# Patient Record
Sex: MALE | Race: WHITE | NOT HISPANIC OR LATINO | URBAN - METROPOLITAN AREA
[De-identification: names, ages, dates, MRNs, and addresses within clinical notes are randomized per-mention and may not be internally consistent; named-entity substitution may affect disease eponyms.]

---

## 2024-04-27 ENCOUNTER — EMERGENCY (EMERGENCY)
Facility: HOSPITAL | Age: 19
LOS: 1 days | Discharge: HOME | End: 2024-04-27
Attending: STUDENT IN AN ORGANIZED HEALTH CARE EDUCATION/TRAINING PROGRAM
Payer: SELF-PAY

## 2024-04-27 VITALS
RESPIRATION RATE: 18 BRPM | TEMPERATURE: 98 F | DIASTOLIC BLOOD PRESSURE: 67 MMHG | OXYGEN SATURATION: 99 % | SYSTOLIC BLOOD PRESSURE: 111 MMHG | HEART RATE: 85 BPM

## 2024-04-27 PROCEDURE — 12001 RPR S/N/AX/GEN/TRNK 2.5CM/<: CPT | Mod: F9

## 2024-04-27 PROCEDURE — 99283 EMERGENCY DEPT VISIT LOW MDM: CPT

## 2024-04-27 PROCEDURE — 99053 MED SERV 10PM-8AM 24 HR FAC: CPT

## 2024-04-27 PROCEDURE — 11740 EVACUATION SUBUNGUAL HMTMA: CPT | Mod: 59

## 2024-04-27 PROCEDURE — 73130 X-RAY EXAM OF HAND: CPT

## 2024-04-27 PROCEDURE — 12001 RPR S/N/AX/GEN/TRNK 2.5CM/<: CPT

## 2024-04-27 PROCEDURE — 99284 EMERGENCY DEPT VISIT MOD MDM: CPT | Mod: 25

## 2024-04-27 PROCEDURE — 11740 EVACUATION SUBUNGUAL HMTMA: CPT | Mod: F9,XU

## 2024-04-27 PROCEDURE — 73130 X-RAY EXAM OF HAND: CPT | Mod: 26,RT

## 2024-04-27 NOTE — ED PROCEDURE NOTE - CPROC ED TIME OUT STATEMENT1
“Patient's name, , procedure and correct site were confirmed during the [facility] Timeout.”
“Patient's name, , procedure and correct site were confirmed during the [facility] Timeout.”

## 2024-04-27 NOTE — ED PROVIDER NOTE - CLINICAL SUMMARY MEDICAL DECISION MAKING FREE TEXT BOX
17 y/o male presents to the ED for laceration to right pinky finger secondary to getting it stuck between carrying TV and the floor. Upon exam, well appearing male in no acute distress with ~1 cm laceration to the distal palmar surface of the pinky. Neurovascularly intact with full ROM. Small area of nail discoloration noted, nail trepidation performed and blood expelled from the area.   -Patient declined tetanus   -7 sutures placed, return precautions verbalized and understood. Hemostasis achieved, area well re-approximated.   Pt re-assessed at this time, feeling well, noting improvement in symptoms. VSS, tolerating PO intake, ambulating in ED with steady gait. All results reviewed with pt, all questions answered. Pt provided copy of all results. Return precautions given. Stable for dc. Case discussed with Attending.

## 2024-04-27 NOTE — ED PROVIDER NOTE - PHYSICAL EXAMINATION
Gen: No acute distress, non toxic  HENT: NCAT, Mucous membranes moist   Eyes: EOMI, PERRL  CV: RRR, nl s1/s2.  Resp: CTAB, normal rate and effort  Neuro: A&O x 3, sensorimotor intact without deficits, sensation intact to distal digits. Flex/extension intact right hand/digits    MSK: No spine or joint tenderness to palpation, Full ROM ext x 4  Skin: (+) ~1 cm laceration to palmar surface of distal right pinky finger, well approximated. Discoloration to the right hand pinky finger nail bed  Ambulatory in the ED

## 2024-04-27 NOTE — ED PROVIDER NOTE - OBJECTIVE STATEMENT
17 y/o male with no pmhx presents to the ED for laceration to right pinky finger secondary to getting it stuck between carrying TV and the floor. Dad at bedside. Patient unsure of tetanus. No other injuries noted. No head-strike, no wrist/elbow/shoulder pain.  No CP/SOB, no n/v.

## 2024-04-27 NOTE — ED PROVIDER NOTE - NSFOLLOWUPINSTRUCTIONS_ED_ALL_ED_FT

## 2024-04-27 NOTE — ED PROVIDER NOTE - ATTENDING APP SHARED VISIT CONTRIBUTION OF CARE
Patient with laceration to right fifth digit secondary to moving a TV no fracture or foreign body noted on x-ray will do lack repair and Adacel

## 2024-04-27 NOTE — ED PROVIDER NOTE - PATIENT PORTAL LINK FT
You can access the FollowMyHealth Patient Portal offered by [facility] by registering at the following website: [email or web address]. By joining [facility]’s FollowMyHealth portal, you will also be able to view your health information using other applications (apps) compatible with our system.